# Patient Record
Sex: FEMALE | Race: WHITE | NOT HISPANIC OR LATINO | Employment: FULL TIME | URBAN - METROPOLITAN AREA
[De-identification: names, ages, dates, MRNs, and addresses within clinical notes are randomized per-mention and may not be internally consistent; named-entity substitution may affect disease eponyms.]

---

## 2019-05-13 ENCOUNTER — TRANSCRIBE ORDERS (OUTPATIENT)
Dept: WOMENS IMAGING | Facility: HOSPITAL | Age: 24
End: 2019-05-13

## 2019-05-13 DIAGNOSIS — O09.291 PRIOR PREGNANCY WITH FETAL DEMISE AND CURRENT PREGNANCY, FIRST TRIMESTER: ICD-10-CM

## 2019-05-13 DIAGNOSIS — D68.51 FACTOR V LEIDEN MUTATION (HCC): ICD-10-CM

## 2019-05-13 DIAGNOSIS — O09.291 HX OF PREECLAMPSIA, PRIOR PREGNANCY, CURRENTLY PREGNANT, FIRST TRIMESTER: Primary | ICD-10-CM

## 2019-05-31 LAB
EXTERNAL ABO GROUPING: NORMAL
EXTERNAL ANTIBODY SCREEN: NEGATIVE
EXTERNAL GROUP B STREP ANTIGEN: NEGATIVE
EXTERNAL HEPATITIS B SURFACE ANTIGEN: NEGATIVE
EXTERNAL RH FACTOR: POSITIVE
EXTERNAL RUBELLA QUALITATIVE: NORMAL
EXTERNAL SYPHILIS RPR SCREEN: NORMAL
HIV1 P24 AG SERPL QL IA: NORMAL

## 2019-06-11 ENCOUNTER — HOSPITAL ENCOUNTER (OUTPATIENT)
Dept: WOMENS IMAGING | Facility: HOSPITAL | Age: 24
Discharge: HOME | End: 2019-06-11
Admitting: OBSTETRICS & GYNECOLOGY

## 2019-06-11 ENCOUNTER — OFFICE VISIT (OUTPATIENT)
Dept: OBSTETRICS AND GYNECOLOGY | Facility: HOSPITAL | Age: 24
End: 2019-06-11

## 2019-06-11 VITALS
WEIGHT: 176.4 LBS | HEIGHT: 60 IN | DIASTOLIC BLOOD PRESSURE: 77 MMHG | BODY MASS INDEX: 34.63 KG/M2 | SYSTOLIC BLOOD PRESSURE: 138 MMHG

## 2019-06-11 DIAGNOSIS — O09.291 PRIOR PREGNANCY WITH FETAL DEMISE AND CURRENT PREGNANCY, FIRST TRIMESTER: ICD-10-CM

## 2019-06-11 DIAGNOSIS — D68.52 PROTHROMBIN GENE MUTATION (HCC): ICD-10-CM

## 2019-06-11 DIAGNOSIS — O09.299 PREVIOUS STILLBIRTH OR DEMISE, ANTEPARTUM: Primary | ICD-10-CM

## 2019-06-11 DIAGNOSIS — O09.291 HX OF PREECLAMPSIA, PRIOR PREGNANCY, CURRENTLY PREGNANT, FIRST TRIMESTER: ICD-10-CM

## 2019-06-11 DIAGNOSIS — D68.51 FACTOR V LEIDEN MUTATION (HCC): ICD-10-CM

## 2019-06-11 PROCEDURE — 76801 OB US < 14 WKS SINGLE FETUS: CPT | Performed by: OBSTETRICS & GYNECOLOGY

## 2019-06-11 PROCEDURE — 76801 OB US < 14 WKS SINGLE FETUS: CPT

## 2019-06-11 NOTE — PROGRESS NOTES
Denies problems with pregnancy. Hx preeclampsia & 24 wk fetal demise. Has Factor 2 Leiden. Taking aspirin 81 mg PO daily. Denies having genetic testing.Next OB appt 6/28/19.

## 2019-06-11 NOTE — PROGRESS NOTES
Documentation of the ultasound findings, images, and interpretations with be available in the patient's Viewpoint report located in the Chart Review Imaging tab in [employer].

## 2019-07-30 ENCOUNTER — OFFICE VISIT (OUTPATIENT)
Dept: OBSTETRICS AND GYNECOLOGY | Facility: HOSPITAL | Age: 24
End: 2019-07-30

## 2019-07-30 ENCOUNTER — HOSPITAL ENCOUNTER (OUTPATIENT)
Dept: WOMENS IMAGING | Facility: HOSPITAL | Age: 24
Discharge: HOME | End: 2019-07-30
Admitting: OBSTETRICS & GYNECOLOGY

## 2019-07-30 VITALS — SYSTOLIC BLOOD PRESSURE: 129 MMHG | WEIGHT: 179.6 LBS | BODY MASS INDEX: 35.08 KG/M2 | DIASTOLIC BLOOD PRESSURE: 90 MMHG

## 2019-07-30 DIAGNOSIS — Z34.90 PREGNANCY, UNSPECIFIED GESTATIONAL AGE: ICD-10-CM

## 2019-07-30 DIAGNOSIS — O09.299 PREVIOUS STILLBIRTH OR DEMISE, ANTEPARTUM: ICD-10-CM

## 2019-07-30 DIAGNOSIS — D68.52 PROTHROMBIN GENE MUTATION (HCC): Primary | ICD-10-CM

## 2019-07-30 DIAGNOSIS — D68.52 PROTHROMBIN GENE MUTATION (HCC): ICD-10-CM

## 2019-07-30 PROCEDURE — 76811 OB US DETAILED SNGL FETUS: CPT | Performed by: OBSTETRICS & GYNECOLOGY

## 2019-07-30 PROCEDURE — 76811 OB US DETAILED SNGL FETUS: CPT

## 2019-07-30 RX ORDER — LANOLIN ALCOHOL/MO/W.PET/CERES
400 CREAM (GRAM) TOPICAL DAILY
COMMUNITY

## 2019-07-30 NOTE — PROGRESS NOTES
"Reports BP was 102 73 @ home. States \"I have white coat syndrome; I'm extremely nervous today.\" Reports she has a respiratory illness. Denies other problems. Next OB visit is 8/15/2019.  "

## 2019-08-27 ENCOUNTER — APPOINTMENT (OUTPATIENT)
Dept: WOMENS IMAGING | Facility: HOSPITAL | Age: 24
End: 2019-08-27

## 2019-08-29 ENCOUNTER — LAB REQUISITION (OUTPATIENT)
Dept: LAB | Facility: HOSPITAL | Age: 24
End: 2019-08-29

## 2019-08-29 ENCOUNTER — HOSPITAL ENCOUNTER (OUTPATIENT)
Dept: WOMENS IMAGING | Facility: HOSPITAL | Age: 24
Discharge: HOME | End: 2019-08-29
Admitting: OBSTETRICS & GYNECOLOGY

## 2019-08-29 ENCOUNTER — OFFICE VISIT (OUTPATIENT)
Dept: OBSTETRICS AND GYNECOLOGY | Facility: HOSPITAL | Age: 24
End: 2019-08-29

## 2019-08-29 VITALS — WEIGHT: 184 LBS | SYSTOLIC BLOOD PRESSURE: 145 MMHG | BODY MASS INDEX: 35.94 KG/M2 | DIASTOLIC BLOOD PRESSURE: 77 MMHG

## 2019-08-29 DIAGNOSIS — Q56.4 AMBIGUOUS GENITALIA: ICD-10-CM

## 2019-08-29 DIAGNOSIS — Z34.90 PREGNANCY, UNSPECIFIED GESTATIONAL AGE: ICD-10-CM

## 2019-08-29 DIAGNOSIS — O09.299 PREVIOUS STILLBIRTH OR DEMISE, ANTEPARTUM: Primary | ICD-10-CM

## 2019-08-29 DIAGNOSIS — O09.299 PREVIOUS STILLBIRTH OR DEMISE, ANTEPARTUM: ICD-10-CM

## 2019-08-29 DIAGNOSIS — D68.52 PROTHROMBIN GENE MUTATION (HCC): ICD-10-CM

## 2019-08-29 DIAGNOSIS — Z00.00 ENCOUNTER FOR GENERAL ADULT MEDICAL EXAMINATION WITHOUT ABNORMAL FINDINGS: ICD-10-CM

## 2019-08-29 PROCEDURE — 76816 OB US FOLLOW-UP PER FETUS: CPT

## 2019-08-29 PROCEDURE — 76816 OB US FOLLOW-UP PER FETUS: CPT | Performed by: OBSTETRICS & GYNECOLOGY

## 2019-08-29 PROCEDURE — 36415 COLL VENOUS BLD VENIPUNCTURE: CPT

## 2019-08-29 NOTE — PROGRESS NOTES
Documentation of the ultasound findings, images, and interpretations with be available in the patient's Viewpoint report located in the Chart Review Imaging tab in [employer].

## 2019-08-29 NOTE — PROGRESS NOTES
"Denies any complaints.  Next OB appointment 9/24/19.  \"I'm always nervous at the doctor after what happened my last pregnancy. It's getting close to when things started happening last time.\"  "

## 2019-08-30 ENCOUNTER — APPOINTMENT (OUTPATIENT)
Dept: WOMENS IMAGING | Facility: HOSPITAL | Age: 24
End: 2019-08-30

## 2019-09-05 ENCOUNTER — TELEPHONE (OUTPATIENT)
Dept: WOMENS IMAGING | Facility: HOSPITAL | Age: 24
End: 2019-09-05

## 2019-09-05 NOTE — TELEPHONE ENCOUNTER
Returned call from pt requesting genetic screening lab results. Informed pt that we have not received results yet but will call pt as soon as we receive results and her dr reviews them. Pt v/u.

## 2019-09-05 NOTE — TELEPHONE ENCOUNTER
----- Message from Kriss Davis sent at 9/5/2019  8:22 AM EDT -----  Regarding: Phone Call  Contact: [phone]  Patient called requesting results from lab work last week.  Please return call.

## 2019-09-09 ENCOUNTER — TELEPHONE (OUTPATIENT)
Dept: OBSTETRICS AND GYNECOLOGY | Facility: HOSPITAL | Age: 24
End: 2019-09-09

## 2019-09-09 NOTE — TELEPHONE ENCOUNTER
Patient contacted by phone concerning results of cell free fetal DNA which returned low risk for trisomies 13,18 and 21.  Sex chromosomes consistent with male.  I remain concerned as to the possibility of ambiguous genitalia in a genetic male based on cell free fetal DNA evaluation.  I noted causes of ambiguous genitalia and a genetic male could include androgen insensitivity syndrome, 5 alpha reductase deficiency as well as abnormalites with the testes or testosterone.  I also noted that at the early gestational age, detailed evaluation of the external genitalia is not always possible.  I suggested that a repeat ultrasound be performed in 4 weeks with further attention to the fetal external genitalia.  If ultrasound findings remain concerning for ambiguous genitalia, the  cord blood should be submitted for evaluation of 5 alpha reductase deficiency, testosterone and dihydrotestosterone.  Further, pre-delivery or post delivery endocrine evaluation could be offered.  Patient verbalized her understanding of the counseling.

## 2019-09-26 ENCOUNTER — OFFICE VISIT (OUTPATIENT)
Dept: OBSTETRICS AND GYNECOLOGY | Facility: HOSPITAL | Age: 24
End: 2019-09-26

## 2019-09-26 ENCOUNTER — HOSPITAL ENCOUNTER (OUTPATIENT)
Dept: WOMENS IMAGING | Facility: HOSPITAL | Age: 24
Discharge: HOME | End: 2019-09-26
Admitting: OBSTETRICS & GYNECOLOGY

## 2019-09-26 VITALS — SYSTOLIC BLOOD PRESSURE: 126 MMHG | WEIGHT: 183.8 LBS | DIASTOLIC BLOOD PRESSURE: 70 MMHG | BODY MASS INDEX: 35.9 KG/M2

## 2019-09-26 DIAGNOSIS — D68.52 PROTHROMBIN GENE MUTATION (HCC): ICD-10-CM

## 2019-09-26 DIAGNOSIS — O09.299 PREVIOUS STILLBIRTH OR DEMISE, ANTEPARTUM: ICD-10-CM

## 2019-09-26 DIAGNOSIS — O09.299 PREVIOUS STILLBIRTH OR DEMISE, ANTEPARTUM: Primary | ICD-10-CM

## 2019-09-26 PROBLEM — Q56.4 AMBIGUOUS GENITALIA: Status: RESOLVED | Noted: 2019-08-29 | Resolved: 2019-09-26

## 2019-09-26 PROCEDURE — 76816 OB US FOLLOW-UP PER FETUS: CPT

## 2019-09-26 PROCEDURE — 76816 OB US FOLLOW-UP PER FETUS: CPT | Performed by: OBSTETRICS & GYNECOLOGY

## 2019-09-26 NOTE — PROGRESS NOTES
Documentation of the ultasound findings, images, and interpretations with be available in the patient's Viewpoint report located in the Chart Review Imaging tab in [employer].

## 2019-11-12 ENCOUNTER — HOSPITAL ENCOUNTER (OUTPATIENT)
Dept: ULTRASOUND IMAGING | Facility: HOSPITAL | Age: 24
Discharge: HOME | End: 2019-11-12
Admitting: OBSTETRICS & GYNECOLOGY

## 2019-11-12 DIAGNOSIS — O09.299 PREVIOUS STILLBIRTH OR DEMISE, ANTEPARTUM: ICD-10-CM

## 2019-11-12 DIAGNOSIS — D68.52 PROTHROMBIN GENE MUTATION (HCC): ICD-10-CM

## 2019-11-12 PROCEDURE — 76816 OB US FOLLOW-UP PER FETUS: CPT

## 2019-11-12 PROCEDURE — 76820 UMBILICAL ARTERY ECHO: CPT

## 2019-11-12 PROCEDURE — 76820 UMBILICAL ARTERY ECHO: CPT | Performed by: OBSTETRICS & GYNECOLOGY

## 2019-11-12 PROCEDURE — 76816 OB US FOLLOW-UP PER FETUS: CPT | Performed by: OBSTETRICS & GYNECOLOGY

## 2019-11-15 ENCOUNTER — HOSPITAL ENCOUNTER (OUTPATIENT)
Facility: HOSPITAL | Age: 24
Discharge: HOME | End: 2019-11-15
Attending: OBSTETRICS & GYNECOLOGY | Admitting: OBSTETRICS & GYNECOLOGY

## 2019-11-15 VITALS — SYSTOLIC BLOOD PRESSURE: 129 MMHG | DIASTOLIC BLOOD PRESSURE: 78 MMHG | RESPIRATION RATE: 18 BRPM | HEART RATE: 93 BPM

## 2019-11-15 PROBLEM — O16.9 HYPERTENSION AFFECTING PREGNANCY: Status: ACTIVE | Noted: 2019-11-15

## 2019-11-15 LAB
ALBUMIN SERPL-MCNC: 3.4 G/DL (ref 3.5–5.2)
ALBUMIN SERPL-MCNC: 3.4 G/DL (ref 3.5–5.2)
ALBUMIN/GLOB SERPL: 1.1 G/DL
ALP SERPL-CCNC: 99 U/L (ref 39–117)
ALP SERPL-CCNC: 99 U/L (ref 39–117)
ALT SERPL W P-5'-P-CCNC: 15 U/L (ref 1–33)
ALT SERPL W P-5'-P-CCNC: 15 U/L (ref 1–33)
ANION GAP SERPL CALCULATED.3IONS-SCNC: 12.9 MMOL/L (ref 5–15)
AST SERPL-CCNC: 17 U/L (ref 1–32)
AST SERPL-CCNC: 17 U/L (ref 1–32)
BASOPHILS # BLD AUTO: 0.03 10*3/MM3 (ref 0–0.2)
BASOPHILS NFR BLD AUTO: 0.3 % (ref 0–1.5)
BILIRUB CONJ SERPL-MCNC: <0.2 MG/DL (ref 0.2–0.3)
BILIRUB INDIRECT SERPL-MCNC: ABNORMAL MG/DL
BILIRUB SERPL-MCNC: 0.4 MG/DL (ref 0.2–1.2)
BILIRUB SERPL-MCNC: 0.4 MG/DL (ref 0.2–1.2)
BUN BLD-MCNC: 6 MG/DL (ref 6–20)
BUN/CREAT SERPL: 11.1 (ref 7–25)
CALCIUM SPEC-SCNC: 9.1 MG/DL (ref 8.6–10.5)
CHLORIDE SERPL-SCNC: 107 MMOL/L (ref 98–107)
CO2 SERPL-SCNC: 17.1 MMOL/L (ref 22–29)
CREAT BLD-MCNC: 0.54 MG/DL (ref 0.57–1)
DEPRECATED RDW RBC AUTO: 43.2 FL (ref 37–54)
EOSINOPHIL # BLD AUTO: 0.05 10*3/MM3 (ref 0–0.4)
EOSINOPHIL NFR BLD AUTO: 0.5 % (ref 0.3–6.2)
ERYTHROCYTE [DISTWIDTH] IN BLOOD BY AUTOMATED COUNT: 13.3 % (ref 12.3–15.4)
GFR SERPL CREATININE-BSD FRML MDRD: 136 ML/MIN/1.73
GLOBULIN UR ELPH-MCNC: 3.2 GM/DL
GLUCOSE BLD-MCNC: 104 MG/DL (ref 65–99)
HCT VFR BLD AUTO: 37.3 % (ref 34–46.6)
HGB BLD-MCNC: 12.3 G/DL (ref 12–15.9)
IMM GRANULOCYTES # BLD AUTO: 0.04 10*3/MM3 (ref 0–0.05)
IMM GRANULOCYTES NFR BLD AUTO: 0.4 % (ref 0–0.5)
LYMPHOCYTES # BLD AUTO: 1.05 10*3/MM3 (ref 0.7–3.1)
LYMPHOCYTES NFR BLD AUTO: 10.2 % (ref 19.6–45.3)
MCH RBC QN AUTO: 29.2 PG (ref 26.6–33)
MCHC RBC AUTO-ENTMCNC: 33 G/DL (ref 31.5–35.7)
MCV RBC AUTO: 88.6 FL (ref 79–97)
MONOCYTES # BLD AUTO: 0.32 10*3/MM3 (ref 0.1–0.9)
MONOCYTES NFR BLD AUTO: 3.1 % (ref 5–12)
NEUTROPHILS # BLD AUTO: 8.84 10*3/MM3 (ref 1.7–7)
NEUTROPHILS NFR BLD AUTO: 85.5 % (ref 42.7–76)
NRBC BLD AUTO-RTO: 0 /100 WBC (ref 0–0.2)
PLATELET # BLD AUTO: 227 10*3/MM3 (ref 140–450)
PMV BLD AUTO: 10.6 FL (ref 6–12)
POTASSIUM BLD-SCNC: 3.9 MMOL/L (ref 3.5–5.2)
PROT SERPL-MCNC: 6.6 G/DL (ref 6–8.5)
PROT SERPL-MCNC: 6.6 G/DL (ref 6–8.5)
RBC # BLD AUTO: 4.21 10*6/MM3 (ref 3.77–5.28)
SODIUM BLD-SCNC: 137 MMOL/L (ref 136–145)
URATE SERPL-MCNC: 4 MG/DL (ref 2.4–5.7)
WBC NRBC COR # BLD: 10.33 10*3/MM3 (ref 3.4–10.8)

## 2019-11-15 PROCEDURE — 59025 FETAL NON-STRESS TEST: CPT

## 2019-11-15 PROCEDURE — 84550 ASSAY OF BLOOD/URIC ACID: CPT | Performed by: OBSTETRICS & GYNECOLOGY

## 2019-11-15 PROCEDURE — 80053 COMPREHEN METABOLIC PANEL: CPT | Performed by: OBSTETRICS & GYNECOLOGY

## 2019-11-15 PROCEDURE — G0463 HOSPITAL OUTPT CLINIC VISIT: HCPCS

## 2019-11-15 PROCEDURE — 80076 HEPATIC FUNCTION PANEL: CPT | Performed by: OBSTETRICS & GYNECOLOGY

## 2019-11-15 PROCEDURE — 36415 COLL VENOUS BLD VENIPUNCTURE: CPT | Performed by: OBSTETRICS & GYNECOLOGY

## 2019-11-15 PROCEDURE — 85025 COMPLETE CBC W/AUTO DIFF WBC: CPT | Performed by: OBSTETRICS & GYNECOLOGY

## 2019-11-15 NOTE — NON STRESS TEST
Nabila Voss, a  at 33w1d with an JOVANNA of 2020, by Ultrasound, was seen at [facility] LABOR DELIVERY for a nonstress test.    Chief Complaint   Patient presents with   • Hypertension-Pregnant     Elevated SD ratio in office today, elevated bp reading in office as well. Dr cheek sent pt over for labs and NST. Denies bleeding, LOF, + FM       Patient Active Problem List   Diagnosis   • Previous stillbirth or demise, antepartum   • Prothrombin gene mutation (CMS/[facility])   • Hypertension affecting pregnancy       Start Time: 1155  Stop Time: 1215    Interpretation A  Nonstress Test Interpretation A: Reactive (11/15/19 1215 : Melanie Emmanuel, RN)  Comments A: verified by carissa thao rn (11/15/19 1215 : Melanie Emmanuel, RN)

## 2019-11-15 NOTE — NURSING NOTE
Spoke with MD about patient labs, reactive NST. Sending pt home with 24 hour urine collection samples to start Sunday, to bring to office Monday per Dr Donald.

## 2019-11-15 NOTE — H&P
"      Chief complaint CHTN. Gestational diabetic, Factor 5 Leiden, Elevated SD. IUFD at 24 weeks.    History of Present Illness: Patient is a 24 y.o. female who presents with IUP at 33w1d weeks gestation. G 2, P 0100.. CHTN, Gestational Diabetic, IUFD at 24 weeks, factor 5 Leiden, elevated SD.        Past Medical History:   Diagnosis Date   • Disease of thyroid gland     10-15 yrs ago. No problems in years.    • Hx of preeclampsia, prior pregnancy, currently pregnant    • Prothrombin gene mutation (CMS/HCC)     \"heterozygous\"; dx @ [facility] after 24 week IUFD   CHTN  Gestational Diabetic   IUFD at 24 weeks  Factor 5 Leiden     Blood Type: A+  Fetal Gender: Male     Past Surgical History:   Procedure Laterality Date   • BREAST AUGMENTATION  2011     No family history on file.  Social History     Tobacco Use   • Smoking status: Never Smoker   • Smokeless tobacco: Never Used   Substance Use Topics   • Alcohol use: No     Frequency: Never   • Drug use: No     Medications Prior to Admission   Medication Sig Dispense Refill Last Dose   • aspirin 81 MG tablet Take 81 mg by mouth Daily.   Taking   • folic acid (FOLVITE) 400 MCG tablet Take 400 mcg by mouth Daily.   Taking   • Prenatal Vit-Fe Fumarate-FA (PRENATAL VITAMIN PO) Take 1 tablet by mouth Daily.   Taking     Allergies:  Stadol [butorphanol]      Vital Signs   See Chart     Radiology  Imaging Results (Last 24 Hours)     ** No results found for the last 24 hours. **          Labs  Lab Results (last 24 hours)     ** No results found for the last 24 hours. **            Review of Systems    The following systems were reviewed and negative;  ENT, respiratory, cardiovascular, gastrointestinal, genitourinary, breast, endocrine and allergies / immunologic.      Physical Exam:      General Appearance:    Alert, cooperative, in no acute distress   Head:    Normocephalic, without obvious abnormality, atraumatic   Eyes:            Lids and lashes normal, conjunctivae and sclerae " normal, no   icterus, no pallor, corneas clear, PERRLA   Ears:    Ears appear intact with no abnormalities noted   Throat:   No oral lesions, no thrush, oral mucosa moist   Neck:   No adenopathy, supple, trachea midline, no thyromegaly, no     carotid bruit, no JVD   Back:     No kyphosis present, no scoliosis present, no skin lesions,       erythema or scars, no tenderness to percussion or                   palpation,   range of motion normal   Lungs:     Clear to auscultation,respirations regular, even and                   unlabored    Heart:    Regular rhythm and normal rate, normal S1 and S2, no            murmur, no gallop, no rub, no click   Breast Exam:    Deferred   Abdomen:     Normal bowel sounds, no masses, no organomegaly, soft        non-tender, non-distended, no guarding, no rebound                 tenderness   Genitalia:    Deferred   Extremities:   Moves all extremities well, no edema, no cyanosis, no              redness   Pulses:   Pulses palpable and equal bilaterally   Skin:   No bleeding, bruising or rash   Lymph nodes:   No palpable adenopathy   Neurologic:   Cranial nerves 2 - 12 grossly intact, sensation intact, DTR        present and equal bilaterally         Assessment: Patient is a 24 y.o. female who presents with IUP at 33w1d weeks gestation. G 2, P 0100.. CHTN, Gestational Diabetic, IUFD at 24 weeks, factor 5 Leiden, elevated SD.     Plan of Care: Admit. Proceed with pre eclamptic labs, extended observation.      Boone Casper III, MD  11/15/19  11:11 AM

## 2019-11-19 DIAGNOSIS — O09.299 PREVIOUS STILLBIRTH OR DEMISE, ANTEPARTUM: Primary | ICD-10-CM

## 2019-11-19 DIAGNOSIS — D68.52 PROTHROMBIN GENE MUTATION (HCC): ICD-10-CM

## 2019-12-04 ENCOUNTER — APPOINTMENT (OUTPATIENT)
Dept: WOMENS IMAGING | Facility: HOSPITAL | Age: 24
End: 2019-12-04

## 2019-12-11 ENCOUNTER — HOSPITAL ENCOUNTER (INPATIENT)
Facility: HOSPITAL | Age: 24
LOS: 1 days | Discharge: HOME | End: 2019-12-12
Attending: OBSTETRICS & GYNECOLOGY | Admitting: OBSTETRICS & GYNECOLOGY

## 2019-12-11 ENCOUNTER — HOSPITAL ENCOUNTER (OUTPATIENT)
Dept: LABOR AND DELIVERY | Facility: HOSPITAL | Age: 24
Discharge: HOME | End: 2019-12-11

## 2019-12-11 PROBLEM — Z34.90 PREGNANT: Status: ACTIVE | Noted: 2019-12-11

## 2019-12-11 LAB
ABO GROUP BLD: NORMAL
ALBUMIN SERPL-MCNC: 3.24 G/DL (ref 3.5–5.2)
ALBUMIN/GLOB SERPL: 1 G/DL
ALP SERPL-CCNC: 123 U/L (ref 39–117)
ALT SERPL W P-5'-P-CCNC: 11 U/L (ref 1–33)
ANION GAP SERPL CALCULATED.3IONS-SCNC: 16.1 MMOL/L (ref 5–15)
AST SERPL-CCNC: 18 U/L (ref 1–32)
BASOPHILS # BLD AUTO: 0.05 10*3/MM3 (ref 0–0.2)
BASOPHILS NFR BLD AUTO: 0.5 % (ref 0–1.5)
BILIRUB SERPL-MCNC: 0.5 MG/DL (ref 0.2–1.2)
BLD GP AB SCN SERPL QL: NEGATIVE
BUN BLD-MCNC: 8 MG/DL (ref 6–20)
BUN/CREAT SERPL: 11.9 (ref 7–25)
CALCIUM SPEC-SCNC: 9.1 MG/DL (ref 8.6–10.5)
CHLORIDE SERPL-SCNC: 106 MMOL/L (ref 98–107)
CO2 SERPL-SCNC: 17.9 MMOL/L (ref 22–29)
CREAT BLD-MCNC: 0.67 MG/DL (ref 0.57–1)
DEPRECATED RDW RBC AUTO: 42.2 FL (ref 37–54)
EOSINOPHIL # BLD AUTO: 0.06 10*3/MM3 (ref 0–0.4)
EOSINOPHIL NFR BLD AUTO: 0.6 % (ref 0.3–6.2)
ERYTHROCYTE [DISTWIDTH] IN BLOOD BY AUTOMATED COUNT: 12.9 % (ref 12.3–15.4)
GFR SERPL CREATININE-BSD FRML MDRD: 106 ML/MIN/1.73
GLOBULIN UR ELPH-MCNC: 3.4 GM/DL
GLUCOSE BLD-MCNC: 90 MG/DL (ref 65–99)
GLUCOSE BLDC GLUCOMTR-MCNC: 87 MG/DL (ref 70–130)
GLUCOSE BLDC GLUCOMTR-MCNC: 89 MG/DL (ref 70–130)
HCT VFR BLD AUTO: 38 % (ref 34–46.6)
HGB BLD-MCNC: 12.7 G/DL (ref 12–15.9)
IMM GRANULOCYTES # BLD AUTO: 0.03 10*3/MM3 (ref 0–0.05)
IMM GRANULOCYTES NFR BLD AUTO: 0.3 % (ref 0–0.5)
LYMPHOCYTES # BLD AUTO: 1.85 10*3/MM3 (ref 0.7–3.1)
LYMPHOCYTES NFR BLD AUTO: 18.5 % (ref 19.6–45.3)
MCH RBC QN AUTO: 29.6 PG (ref 26.6–33)
MCHC RBC AUTO-ENTMCNC: 33.4 G/DL (ref 31.5–35.7)
MCV RBC AUTO: 88.6 FL (ref 79–97)
MONOCYTES # BLD AUTO: 0.51 10*3/MM3 (ref 0.1–0.9)
MONOCYTES NFR BLD AUTO: 5.1 % (ref 5–12)
NEUTROPHILS # BLD AUTO: 7.48 10*3/MM3 (ref 1.7–7)
NEUTROPHILS NFR BLD AUTO: 75 % (ref 42.7–76)
NRBC BLD AUTO-RTO: 0 /100 WBC (ref 0–0.2)
PLATELET # BLD AUTO: 216 10*3/MM3 (ref 140–450)
PMV BLD AUTO: 11.6 FL (ref 6–12)
POTASSIUM BLD-SCNC: 3.5 MMOL/L (ref 3.5–5.2)
PROT SERPL-MCNC: 6.6 G/DL (ref 6–8.5)
RBC # BLD AUTO: 4.29 10*6/MM3 (ref 3.77–5.28)
RH BLD: POSITIVE
SODIUM BLD-SCNC: 140 MMOL/L (ref 136–145)
T&S EXPIRATION DATE: NORMAL
WBC NRBC COR # BLD: 9.98 10*3/MM3 (ref 3.4–10.8)

## 2019-12-11 PROCEDURE — 86900 BLOOD TYPING SEROLOGIC ABO: CPT | Performed by: OBSTETRICS & GYNECOLOGY

## 2019-12-11 PROCEDURE — 85025 COMPLETE CBC W/AUTO DIFF WBC: CPT | Performed by: OBSTETRICS & GYNECOLOGY

## 2019-12-11 PROCEDURE — 86900 BLOOD TYPING SEROLOGIC ABO: CPT

## 2019-12-11 PROCEDURE — 86901 BLOOD TYPING SEROLOGIC RH(D): CPT | Performed by: OBSTETRICS & GYNECOLOGY

## 2019-12-11 PROCEDURE — 80053 COMPREHEN METABOLIC PANEL: CPT | Performed by: OBSTETRICS & GYNECOLOGY

## 2019-12-11 PROCEDURE — 82962 GLUCOSE BLOOD TEST: CPT

## 2019-12-11 PROCEDURE — 86901 BLOOD TYPING SEROLOGIC RH(D): CPT

## 2019-12-11 PROCEDURE — 86850 RBC ANTIBODY SCREEN: CPT | Performed by: OBSTETRICS & GYNECOLOGY

## 2019-12-11 RX ORDER — SODIUM CHLORIDE, SODIUM LACTATE, POTASSIUM CHLORIDE, CALCIUM CHLORIDE 600; 310; 30; 20 MG/100ML; MG/100ML; MG/100ML; MG/100ML
125 INJECTION, SOLUTION INTRAVENOUS CONTINUOUS
Status: DISCONTINUED | OUTPATIENT
Start: 2019-12-11 | End: 2019-12-12

## 2019-12-11 RX ORDER — SODIUM CHLORIDE 0.9 % (FLUSH) 0.9 %
3-10 SYRINGE (ML) INJECTION AS NEEDED
Status: DISCONTINUED | OUTPATIENT
Start: 2019-12-11 | End: 2019-12-12

## 2019-12-11 RX ORDER — PRENATAL VIT/IRON FUM/FOLIC AC 27MG-0.8MG
1 TABLET ORAL DAILY
Status: DISCONTINUED | OUTPATIENT
Start: 2019-12-12 | End: 2019-12-12 | Stop reason: HOSPADM

## 2019-12-11 RX ORDER — ONDANSETRON 2 MG/ML
4 INJECTION INTRAMUSCULAR; INTRAVENOUS EVERY 6 HOURS PRN
Status: DISCONTINUED | OUTPATIENT
Start: 2019-12-11 | End: 2019-12-12

## 2019-12-11 RX ORDER — ASPIRIN 81 MG/1
81 TABLET, CHEWABLE ORAL DAILY
Status: DISCONTINUED | OUTPATIENT
Start: 2019-12-12 | End: 2019-12-12 | Stop reason: HOSPADM

## 2019-12-11 RX ORDER — GLIPIZIDE 5 MG/1
5 TABLET ORAL
Status: DISCONTINUED | OUTPATIENT
Start: 2019-12-12 | End: 2019-12-12 | Stop reason: HOSPADM

## 2019-12-11 RX ORDER — PRENATAL VIT/IRON FUM/FOLIC AC 27MG-0.8MG
1 TABLET ORAL DAILY
COMMUNITY

## 2019-12-11 RX ORDER — OXYTOCIN-SODIUM CHLORIDE 0.9% IV SOLN 30 UNIT/500ML 30-0.9/5 UT/ML-%
2 SOLUTION INTRAVENOUS
Status: DISCONTINUED | OUTPATIENT
Start: 2019-12-11 | End: 2019-12-11

## 2019-12-11 RX ORDER — GLYBURIDE 2.5 MG/1
2.5 TABLET ORAL
COMMUNITY
End: 2019-12-12 | Stop reason: HOSPADM

## 2019-12-11 RX ORDER — ONDANSETRON 4 MG/1
4 TABLET, FILM COATED ORAL EVERY 6 HOURS PRN
Status: DISCONTINUED | OUTPATIENT
Start: 2019-12-11 | End: 2019-12-12

## 2019-12-11 RX ORDER — LANOLIN ALCOHOL/MO/W.PET/CERES
400 CREAM (GRAM) TOPICAL DAILY
Status: DISCONTINUED | OUTPATIENT
Start: 2019-12-12 | End: 2019-12-12 | Stop reason: HOSPADM

## 2019-12-11 RX ORDER — BUTORPHANOL TARTRATE 1 MG/ML
1 INJECTION, SOLUTION INTRAMUSCULAR; INTRAVENOUS
Status: DISCONTINUED | OUTPATIENT
Start: 2019-12-11 | End: 2019-12-12

## 2019-12-11 RX ORDER — ACETAMINOPHEN 325 MG/1
650 TABLET ORAL EVERY 4 HOURS PRN
Status: DISCONTINUED | OUTPATIENT
Start: 2019-12-11 | End: 2019-12-12

## 2019-12-11 RX ORDER — MISOPROSTOL 100 UG/1
25 TABLET ORAL EVERY 4 HOURS PRN
Status: DISCONTINUED | OUTPATIENT
Start: 2019-12-11 | End: 2019-12-12

## 2019-12-11 RX ADMIN — MISOPROSTOL 25 MCG: 100 TABLET ORAL at 22:30

## 2019-12-11 RX ADMIN — MISOPROSTOL 25 MCG: 100 TABLET ORAL at 18:23

## 2019-12-11 NOTE — H&P
BRIAN Reed  Obstetric History and Physical    Chief Complaint   Patient presents with   • Scheduled Induction       Subjective     Patient is a 24 y.o. female  currently at 36w6d, who presents for IOL secondary to GDM, CHTN, H/O IUFD 24 weeks secondary to pre-eclampsia, PGM and elevated Umbilical doppler ratio.    Her prenatal care is complicated by  hypertension  chronic hypertension and diabetes  GDM A2.  Her previous obstetric/gynecological history is noted for is remarkable for previous IUFD as above.      The following portions of the patients history were reviewed and updated as appropriate: current medications, allergies, past medical history, past surgical history, past family history, past social history and problem list .       Prenatal Information:   Maternal Prenatal Labs  Blood Type No results found for: ABO   Rh Status No results found for: RH   Antibody Screen No results found for: ABSCRN   Rapid Urine Drug Screen No results found for: AMPMETHU, BARBITSCNUR, LABBENZSCN, LABMETHSCN, LABOPIASCN, THCURSCR, COCAINEUR, AMPHETSCREEN, PROPOXSCN, BUPRENORSCNU, METAMPSCNUR, OXYCODONESCN, TRICYCLICSCN   Group B Strep Culture No results found for: GBSANTIGEN           External Prenatal Results     Pregnancy Outside Results - Transcribed From Office Records - See Scanned Records For Details     Test Value Date Time    Hgb 12.3 g/dL 11/15/19 1133    Hct 37.3 % 11/15/19 1133    ABO       Rh       Antibody Screen       Glucose Fasting GTT       Glucose Tolerance Test 1 hour       Glucose Tolerance Test 3 hour       Gonorrhea (discrete)       Chlamydia (discrete)       RPR       VDRL       Syphilis Antibody       Rubella       HBsAg       Herpes Simplex Virus PCR       Herpes Simplex VIrus Culture       HIV       Hep C RNA Quant PCR       Hep C Antibody       AFP       Group B Strep       GBS Susceptibility to Clindamycin       GBS Susceptibility to Erythromycin       Fetal Fibronectin       Genetic Testing,  "Maternal Blood             Drug Screening     Test Value Date Time    Urine Drug Screen       Amphetamine Screen       Barbiturate Screen       Benzodiazepine Screen       Methadone Screen       Phencyclidine Screen       Opiates Screen       THC Screen       Cocaine Screen       Propoxyphene Screen       Buprenorphine Screen       Methamphetamine Screen       Oxycodone Screen       Tricyclic Antidepressants Screen                     Past OB History:     OB History    Para Term  AB Living   2 1 0 1 0 0   SAB TAB Ectopic Molar Multiple Live Births   0 0 0 0 0 0      # Outcome Date GA Lbr Carlos A/2nd Weight Sex Delivery Anes PTL Lv   2 Current            1  10/20/16 24w0d  340 g (12 oz) F Vag-Spont   FD      Complications: Preeclampsia, Oligohydramnios, IUGR (intrauterine growth restriction) affecting care of mother      Obstetric Comments   FOB #1 : Pregnancy #1-2       Past Medical History: Past Medical History:   Diagnosis Date   • Disease of thyroid gland     10-15 yrs ago. No problems in years.    • Hx of preeclampsia, prior pregnancy, currently pregnant    • Prothrombin gene mutation (CMS/HCC)     \"heterozygous\"; dx @ [facility] after 24 week IUFD      Past Surgical History Past Surgical History:   Procedure Laterality Date   • BREAST AUGMENTATION        Family History: No family history on file.   Social History:  reports that she has never smoked. She has never used smokeless tobacco.   reports that she does not drink alcohol.   reports that she does not use drugs.        Review of Systems      Objective     Vital Signs Range for the last 24 hours  Temperature: Temp:  [98 °F (36.7 °C)] 98 °F (36.7 °C)   Temp Source: Temp src: Oral   BP:     Pulse:     Respirations: Resp:  [18] 18   Weight: Weight:  [87 kg (191 lb 12.8 oz)] 87 kg (191 lb 12.8 oz)     Physical Examination: General appearance - oriented to person, place, and time  Chest - clear to auscultation, no wheezes, rales or rhonchi, " symmetric air entry  Heart - normal rate and regular rhythm  Abdomen - soft, nontender, nondistended, no masses  Extremities - no pedal edema noted    Presentation: cephalic   Cervix: Exam by:  Dr Donald   Dilation:  Fingertip   Effacement:  50   Station:  -4         Assessment/Plan       Pregnant      Assessment & Plan    Assessment/Plan:  1.  Intrauterine pregnancy at 36w6d weeks gestation with reactive fetal status.    2.  A2DM- glyburide 2.5 mg at home but none today.  BS q1 hour and if normal q4 hour through the night.    3. CHTN- stable no medication  4. Heterozygous PGM- stopped ASA yesterday.   5. H/O IUFD with severe pre-eclampsia 24 weeks previous pregnancy.  For delivery today.   6. Elevated UA SD ratio- for delivery.       Heydi Donald DO  12/11/2019  6:28 PM

## 2019-12-12 ENCOUNTER — ANESTHESIA EVENT (OUTPATIENT)
Dept: LABOR AND DELIVERY | Facility: HOSPITAL | Age: 24
End: 2019-12-12

## 2019-12-12 ENCOUNTER — ANESTHESIA (OUTPATIENT)
Dept: LABOR AND DELIVERY | Facility: HOSPITAL | Age: 24
End: 2019-12-12

## 2019-12-12 VITALS
RESPIRATION RATE: 32 BRPM | DIASTOLIC BLOOD PRESSURE: 63 MMHG | SYSTOLIC BLOOD PRESSURE: 127 MMHG | BODY MASS INDEX: 37.66 KG/M2 | WEIGHT: 191.8 LBS | OXYGEN SATURATION: 99 % | TEMPERATURE: 98.2 F | HEIGHT: 60 IN | HEART RATE: 128 BPM

## 2019-12-12 PROBLEM — Z34.90 PREGNANT: Status: RESOLVED | Noted: 2019-12-11 | Resolved: 2019-12-12

## 2019-12-12 LAB
GLUCOSE BLDC GLUCOMTR-MCNC: 100 MG/DL (ref 70–130)
GLUCOSE BLDC GLUCOMTR-MCNC: 258 MG/DL (ref 70–130)
GLUCOSE BLDC GLUCOMTR-MCNC: 89 MG/DL (ref 70–130)
GLUCOSE BLDC GLUCOMTR-MCNC: 93 MG/DL (ref 70–130)

## 2019-12-12 PROCEDURE — 82962 GLUCOSE BLOOD TEST: CPT

## 2019-12-12 PROCEDURE — 3E033VJ INTRODUCTION OF OTHER HORMONE INTO PERIPHERAL VEIN, PERCUTANEOUS APPROACH: ICD-10-PCS | Performed by: OBSTETRICS & GYNECOLOGY

## 2019-12-12 PROCEDURE — C1755 CATHETER, INTRASPINAL: HCPCS | Performed by: ANESTHESIOLOGY

## 2019-12-12 PROCEDURE — C1755 CATHETER, INTRASPINAL: HCPCS

## 2019-12-12 RX ORDER — SODIUM CHLORIDE 0.9 % (FLUSH) 0.9 %
10 SYRINGE (ML) INJECTION EVERY 12 HOURS SCHEDULED
Status: DISCONTINUED | OUTPATIENT
Start: 2019-12-12 | End: 2019-12-12

## 2019-12-12 RX ORDER — HYDROXYZINE 50 MG/1
50 TABLET, FILM COATED ORAL NIGHTLY PRN
Status: DISCONTINUED | OUTPATIENT
Start: 2019-12-12 | End: 2019-12-12 | Stop reason: HOSPADM

## 2019-12-12 RX ORDER — CARBOPROST TROMETHAMINE 250 UG/ML
250 INJECTION, SOLUTION INTRAMUSCULAR
Status: DISCONTINUED | OUTPATIENT
Start: 2019-12-12 | End: 2019-12-12 | Stop reason: HOSPADM

## 2019-12-12 RX ORDER — SODIUM CHLORIDE 0.9 % (FLUSH) 0.9 %
10 SYRINGE (ML) INJECTION AS NEEDED
Status: DISCONTINUED | OUTPATIENT
Start: 2019-12-12 | End: 2019-12-12

## 2019-12-12 RX ORDER — MINERAL OIL
OIL (ML) MISCELLANEOUS
Status: DISCONTINUED | OUTPATIENT
Start: 2019-12-12 | End: 2019-12-12

## 2019-12-12 RX ORDER — SODIUM CHLORIDE, SODIUM LACTATE, POTASSIUM CHLORIDE, CALCIUM CHLORIDE 600; 310; 30; 20 MG/100ML; MG/100ML; MG/100ML; MG/100ML
125 INJECTION, SOLUTION INTRAVENOUS CONTINUOUS
Status: DISCONTINUED | OUTPATIENT
Start: 2019-12-12 | End: 2019-12-12

## 2019-12-12 RX ORDER — BUPIVACAINE HYDROCHLORIDE 2.5 MG/ML
INJECTION, SOLUTION EPIDURAL; INFILTRATION; INTRACAUDAL
Status: COMPLETED
Start: 2019-12-12 | End: 2019-12-12

## 2019-12-12 RX ORDER — HYDROCODONE BITARTRATE AND ACETAMINOPHEN 5; 325 MG/1; MG/1
1 TABLET ORAL EVERY 4 HOURS PRN
Status: DISCONTINUED | OUTPATIENT
Start: 2019-12-12 | End: 2019-12-12 | Stop reason: HOSPADM

## 2019-12-12 RX ORDER — BUPIVACAINE HYDROCHLORIDE 2.5 MG/ML
INJECTION, SOLUTION EPIDURAL; INFILTRATION; INTRACAUDAL AS NEEDED
Status: DISCONTINUED | OUTPATIENT
Start: 2019-12-12 | End: 2019-12-12 | Stop reason: SURG

## 2019-12-12 RX ORDER — ONDANSETRON 2 MG/ML
4 INJECTION INTRAMUSCULAR; INTRAVENOUS EVERY 6 HOURS PRN
Status: DISCONTINUED | OUTPATIENT
Start: 2019-12-12 | End: 2019-12-12 | Stop reason: HOSPADM

## 2019-12-12 RX ORDER — IBUPROFEN 800 MG/1
800 TABLET ORAL EVERY 6 HOURS PRN
Status: DISCONTINUED | OUTPATIENT
Start: 2019-12-12 | End: 2019-12-12

## 2019-12-12 RX ORDER — BISACODYL 10 MG
10 SUPPOSITORY, RECTAL RECTAL DAILY PRN
Status: DISCONTINUED | OUTPATIENT
Start: 2019-12-13 | End: 2019-12-12 | Stop reason: HOSPADM

## 2019-12-12 RX ORDER — METHYLERGONOVINE MALEATE 0.2 MG/ML
200 INJECTION INTRAVENOUS ONCE AS NEEDED
Status: DISCONTINUED | OUTPATIENT
Start: 2019-12-12 | End: 2019-12-12 | Stop reason: HOSPADM

## 2019-12-12 RX ORDER — LANOLIN 100 %
OINTMENT (GRAM) TOPICAL
Status: DISCONTINUED | OUTPATIENT
Start: 2019-12-12 | End: 2019-12-12 | Stop reason: HOSPADM

## 2019-12-12 RX ORDER — METHYLERGONOVINE MALEATE 0.2 MG/ML
200 INJECTION INTRAVENOUS ONCE AS NEEDED
Status: DISCONTINUED | OUTPATIENT
Start: 2019-12-12 | End: 2019-12-12

## 2019-12-12 RX ORDER — MISOPROSTOL 100 UG/1
600 TABLET ORAL ONCE AS NEEDED
Status: DISCONTINUED | OUTPATIENT
Start: 2019-12-12 | End: 2019-12-12 | Stop reason: HOSPADM

## 2019-12-12 RX ORDER — ASPIRIN 81 MG/1
81 TABLET, CHEWABLE ORAL DAILY
Qty: 30 TABLET | Refills: 1 | Status: SHIPPED | OUTPATIENT
Start: 2019-12-13

## 2019-12-12 RX ORDER — ONDANSETRON 4 MG/1
4 TABLET, FILM COATED ORAL EVERY 6 HOURS PRN
Status: DISCONTINUED | OUTPATIENT
Start: 2019-12-12 | End: 2019-12-12 | Stop reason: HOSPADM

## 2019-12-12 RX ORDER — OXYTOCIN-SODIUM CHLORIDE 0.9% IV SOLN 30 UNIT/500ML 30-0.9/5 UT/ML-%
2 SOLUTION INTRAVENOUS
Status: DISCONTINUED | OUTPATIENT
Start: 2019-12-12 | End: 2019-12-12 | Stop reason: HOSPADM

## 2019-12-12 RX ORDER — ROPIVACAINE HYDROCHLORIDE 2 MG/ML
INJECTION, SOLUTION EPIDURAL; INFILTRATION; PERINEURAL
Status: DISCONTINUED
Start: 2019-12-12 | End: 2019-12-12 | Stop reason: HOSPADM

## 2019-12-12 RX ORDER — ACETAMINOPHEN 325 MG/1
650 TABLET ORAL EVERY 4 HOURS PRN
Status: DISCONTINUED | OUTPATIENT
Start: 2019-12-12 | End: 2019-12-12

## 2019-12-12 RX ORDER — GLIPIZIDE 5 MG/1
5 TABLET ORAL
Qty: 30 TABLET | Refills: 1 | Status: SHIPPED | OUTPATIENT
Start: 2019-12-13

## 2019-12-12 RX ORDER — DOCUSATE SODIUM 100 MG/1
100 CAPSULE, LIQUID FILLED ORAL 2 TIMES DAILY PRN
Status: DISCONTINUED | OUTPATIENT
Start: 2019-12-12 | End: 2019-12-12 | Stop reason: HOSPADM

## 2019-12-12 RX ORDER — SODIUM CHLORIDE 0.9 % (FLUSH) 0.9 %
1-10 SYRINGE (ML) INJECTION AS NEEDED
Status: DISCONTINUED | OUTPATIENT
Start: 2019-12-12 | End: 2019-12-12 | Stop reason: HOSPADM

## 2019-12-12 RX ORDER — IBUPROFEN 800 MG/1
800 TABLET ORAL EVERY 8 HOURS PRN
Qty: 60 TABLET | Refills: 0 | Status: SHIPPED | OUTPATIENT
Start: 2019-12-12

## 2019-12-12 RX ORDER — MISOPROSTOL 100 UG/1
600 TABLET ORAL AS NEEDED
Status: DISCONTINUED | OUTPATIENT
Start: 2019-12-12 | End: 2019-12-12

## 2019-12-12 RX ORDER — CARBOPROST TROMETHAMINE 250 UG/ML
250 INJECTION, SOLUTION INTRAMUSCULAR AS NEEDED
Status: DISCONTINUED | OUTPATIENT
Start: 2019-12-12 | End: 2019-12-12

## 2019-12-12 RX ORDER — IBUPROFEN 800 MG/1
800 TABLET ORAL EVERY 8 HOURS SCHEDULED
Status: DISCONTINUED | OUTPATIENT
Start: 2019-12-12 | End: 2019-12-12 | Stop reason: HOSPADM

## 2019-12-12 RX ADMIN — SODIUM CHLORIDE, POTASSIUM CHLORIDE, SODIUM LACTATE AND CALCIUM CHLORIDE 1000 ML: 600; 310; 30; 20 INJECTION, SOLUTION INTRAVENOUS at 08:20

## 2019-12-12 RX ADMIN — OXYTOCIN 2 MILLI-UNITS/MIN: 10 INJECTION INTRAVENOUS at 02:59

## 2019-12-12 RX ADMIN — BUPIVACAINE HYDROCHLORIDE 10 ML: 2.5 INJECTION, SOLUTION EPIDURAL; INFILTRATION; INTRACAUDAL; PERINEURAL at 07:58

## 2019-12-12 NOTE — PAYOR COMM NOTE
"CONTACT:  PRAMOD BRONSON MSN, APRN  UTILIZATION MANAGEMENT DEPT.  [facility]  [address]  [facility], [address]  PHONE:  [phone]  FAX: [phone]    REQUEST FOR INPATIENT DELIVERY AUTHORIZATION.    ICD 10 CODE: O80  DR. UMU WEBER NPI: [id number]  [facility] NPI: [id number]    Nabila Caldwell (24 y.o. Female)     Date of Birth Social Security Number Address Home Phone MRN    [id number]  [address] [place] [id number] [phone] [id number]    Mandaeism Marital Status          Pentecostal        Admission Date Admission Type Admitting Provider Attending Provider Department, Room/Bed    19 Urgent Heydi Donald, Heydi Qiu DO [facility] LABOR DELIVERY, L229/1    Discharge Date Discharge Disposition Discharge Destination                       Attending Provider:  Heydi Donald DO    Allergies:  Stadol [Butorphanol]    Isolation:  None   Infection:  None   Code Status:  CPR    Ht:  152.4 cm (60\")   Wt:  87 kg (191 lb 12.8 oz)    Admission Cmt:  None   Principal Problem:  None                Active Insurance as of 2019     Primary Coverage     Payor Plan Insurance Group Employer/Plan Group    ANTHEM MEDICAID ANTH MEDICAID [id number]     Payor Plan Address Payor Plan Phone Number Payor Plan Fax Number Effective Dates    [address] [address] [phone]  2019 - None Entered    [facility] [id number]       Subscriber Name Subscriber Birth Date Member ID       NABILA CALDWELL 1995 [id number]                 Emergency Contacts      (Rel.) Home Phone Work Phone Mobile Phone    libia caldwell (Spouse) [phone] -- [phone]        DELIVERY INFORMATION:    ADMIT DATE: 2019    DELIVERY DATE AND TIME: 2019 @ 1207    DELIVERY TYPE: VAGINAL    GENDER OF BABY: MALE    WEIGHT:  2992 GRAMS    APGARS:  8/9    NURSERY TYPE: WELL BABY    GESTATIONAL AGE: 37/0    EDC: 2020    /PARA: 2/1     "   History & Physical      Heydi Donald DO at 19 1828           Kamuela  Obstetric History and Physical    Chief Complaint   Patient presents with   • Scheduled Induction       Subjective     Patient is a 24 y.o. female  currently at 36w6d, who presents for IOL secondary to GDM, CHTN, H/O IUFD 24 weeks secondary to pre-eclampsia, PGM and elevated Umbilical doppler ratio.    Her prenatal care is complicated by  hypertension  chronic hypertension and diabetes  GDM A2.  Her previous obstetric/gynecological history is noted for is remarkable for previous IUFD as above.      The following portions of the patients history were reviewed and updated as appropriate: current medications, allergies, past medical history, past surgical history, past family history, past social history and problem list .       Prenatal Information:   Maternal Prenatal Labs  Blood Type No results found for: ABO   Rh Status No results found for: RH   Antibody Screen No results found for: ABSCRN   Rapid Urine Drug Screen No results found for: AMPMETHU, BARBITSCNUR, LABBENZSCN, LABMETHSCN, LABOPIASCN, THCURSCR, COCAINEUR, AMPHETSCREEN, PROPOXSCN, BUPRENORSCNU, METAMPSCNUR, OXYCODONESCN, TRICYCLICSCN   Group B Strep Culture No results found for: GBSANTIGEN           External Prenatal Results     Pregnancy Outside Results - Transcribed From Office Records - See Scanned Records For Details     Test Value Date Time    Hgb 12.3 g/dL 11/15/19 1133    Hct 37.3 % 11/15/19 1133    ABO       Rh       Antibody Screen       Glucose Fasting GTT       Glucose Tolerance Test 1 hour       Glucose Tolerance Test 3 hour       Gonorrhea (discrete)       Chlamydia (discrete)       RPR       VDRL       Syphilis Antibody       Rubella       HBsAg       Herpes Simplex Virus PCR       Herpes Simplex VIrus Culture       HIV       Hep C RNA Quant PCR       Hep C Antibody       AFP       Group B Strep       GBS Susceptibility to Clindamycin       GBS  "Susceptibility to Erythromycin       Fetal Fibronectin       Genetic Testing, Maternal Blood             Drug Screening     Test Value Date Time    Urine Drug Screen       Amphetamine Screen       Barbiturate Screen       Benzodiazepine Screen       Methadone Screen       Phencyclidine Screen       Opiates Screen       THC Screen       Cocaine Screen       Propoxyphene Screen       Buprenorphine Screen       Methamphetamine Screen       Oxycodone Screen       Tricyclic Antidepressants Screen                     Past OB History:     OB History    Para Term  AB Living   2 1 0 1 0 0   SAB TAB Ectopic Molar Multiple Live Births   0 0 0 0 0 0      # Outcome Date GA Lbr Carlos A/2nd Weight Sex Delivery Anes PTL Lv   2 Current            1  10/20/16 24w0d  340 g (12 oz) F Vag-Spont   FD      Complications: Preeclampsia, Oligohydramnios, IUGR (intrauterine growth restriction) affecting care of mother      Obstetric Comments   FOB #1 : Pregnancy #1-2       Past Medical History: Past Medical History:   Diagnosis Date   • Disease of thyroid gland     10-15 yrs ago. No problems in years.    • Hx of preeclampsia, prior pregnancy, currently pregnant    • Prothrombin gene mutation (CMS/HCC)     \"heterozygous\"; dx @ [facility] after 24 week IUFD      Past Surgical History Past Surgical History:   Procedure Laterality Date   • BREAST AUGMENTATION        Family History: No family history on file.   Social History:  reports that she has never smoked. She has never used smokeless tobacco.   reports that she does not drink alcohol.   reports that she does not use drugs.        Review of Systems      Objective     Vital Signs Range for the last 24 hours  Temperature: Temp:  [98 °F (36.7 °C)] 98 °F (36.7 °C)   Temp Source: Temp src: Oral   BP:     Pulse:     Respirations: Resp:  [18] 18   Weight: Weight:  [87 kg (191 lb 12.8 oz)] 87 kg (191 lb 12.8 oz)     Physical Examination: General appearance - oriented to person, place, " and time  Chest - clear to auscultation, no wheezes, rales or rhonchi, symmetric air entry  Heart - normal rate and regular rhythm  Abdomen - soft, nontender, nondistended, no masses  Extremities - no pedal edema noted    Presentation: cephalic   Cervix: Exam by:  Dr Donald   Dilation:  Fingertip   Effacement:  50   Station:  -4         Assessment/Plan       Pregnant      Assessment & Plan    Assessment/Plan:  1.  Intrauterine pregnancy at 36w6d weeks gestation with reactive fetal status.    2.  A2DM- glyburide 2.5 mg at home but none today.  BS q1 hour and if normal q4 hour through the night.    3. CHTN- stable no medication  4. Heterozygous PGM- stopped ASA yesterday.   5. H/O IUFD with severe pre-eclampsia 24 weeks previous pregnancy.  For delivery today.   6. Elevated UA SD ratio- for delivery.       Heydi Donald DO  12/11/2019  6:28 PM    Electronically signed by Heydi Donald DO at 12/11/19 1835     [facility] at 12/11/19 2029     Scan on 12/11/2019: [employer] 12/11/2019          Electronically signed by Interface, Scans Incoming at 12/11/19 2029          Operative/Procedure Notes (all)      Heydi Donald DO at 12/12/19 1222  Version 1 of [address] [place] [address]  Vaginal Delivery Note    Delivery     Delivery:       YOB: 2021    Time of Birth: 12:07 PM      Anesthesia: Epidural     Delivering clinician: Heydi Donald    Forceps?   No   Vacuum? No    Shoulder dystocia present: No        Delivery narrative:              Pt delivered in the JOSE position.  Mouth and nares were bulb suctioned.  Anterior shoulder delivered atraumatically, followed by posterior shoulder.  Infant was placed to mother's chest.  Cord was doubly clamped and cut.  Cord blood was obtained.  Placenta was delivered spontaneously.  Uterus was firm with fundal massage.        Infant    Findings: male  infant     Infant observations: Weight: No birth weight on file.   Length:     in  Observations/Comments:         Apgars:    @ 1 minute /       @ 5 minutes   Infant Name:      Placenta, Cord, and Fluid    Placenta delivered     at        Cord:   3 VC present.   Nuchal Cord?  no   Cord blood obtained:   yes                  Repair    Episiotomy: Not recorded    Lacerations: No   Estimated Blood Loss:   250 mls.   Suture used for repair:      Complications  none    Disposition  Mother to postpartum in stable condition.    Heydi Donald DO  12/12/19  12:22 PM      Electronically signed by Heydi Donald DO at 12/12/19 3153

## 2019-12-12 NOTE — L&D DELIVERY NOTE
Derek  Vaginal Delivery Note    Delivery     Delivery:       YOB: 2021    Time of Birth: 12:07 PM      Anesthesia: Epidural     Delivering clinician: Heydi Donald    Forceps?   No   Vacuum? No    Shoulder dystocia present: No        Delivery narrative:              Pt delivered in the JOSE position.  Mouth and nares were bulb suctioned.  Anterior shoulder delivered atraumatically, followed by posterior shoulder.  Infant was placed to mother's chest.  Cord was doubly clamped and cut.  Cord blood was obtained.  Placenta was delivered spontaneously.  Uterus was firm with fundal massage.        Infant    Findings: male  infant     Infant observations: Weight: No birth weight on file.   Length:    in  Observations/Comments:         Apgars:    @ 1 minute /       @ 5 minutes   Infant Name:      Placenta, Cord, and Fluid    Placenta delivered     at        Cord:   3 VC present.   Nuchal Cord?  no   Cord blood obtained:   yes                  Repair    Episiotomy: Not recorded    Lacerations: No   Estimated Blood Loss:   250 mls.   Suture used for repair:      Complications  none    Disposition  Mother to postpartum in stable condition.    Heydi Donald DO  12/12/19  12:22 PM

## 2019-12-12 NOTE — ANESTHESIA PROCEDURE NOTES
Labor Epidural    Pre-sedation assessment completed: 12/12/2019 7:45 AM    Patient location during procedure: OB  Start Time: 12/12/2019 7:45 AM  Stop Time: 12/12/2019 7:57 AM  Indication:at surgeon's request  Performed By  Anesthesiologist: Domenico Thomas MD  Preanesthetic Checklist  Completed: patient identified, site marked, surgical consent, pre-op evaluation, timeout performed, IV checked, risks and benefits discussed and monitors and equipment checked  Prep:  Pt Position:sitting  Sterile Tech:gloves, mask, sterile barrier and cap  Prep:povidone-iodine 7.5% surgical scrub  Monitoring:blood pressure monitoring  Epidural Block Procedure:  Approach:midline  Guidance:landmark technique and palpation technique  Location:L3-L4  Needle Type:Tuohy  Needle Gauge:17 G  Loss of Resistance Medium: air  Loss of Resistance: 7cm  Cath Depth at skin:9 cm  Paresthesia: none  Aspiration:negative  Test Dose:negative  Number of Attempts: 1  Post Assessment:  Dressing:occlusive dressing applied and secured with tape  Pt Tolerance:patient tolerated the procedure well with no apparent complications  Complications:no

## 2019-12-12 NOTE — ANESTHESIA PREPROCEDURE EVALUATION
Anesthesia Evaluation     Patient summary reviewed and Nursing notes reviewed   no history of anesthetic complications:  NPO Solid Status: > 8 hours  NPO Liquid Status: > 8 hours           Airway   Mallampati: I  TM distance: >3 FB  Neck ROM: full  No difficulty expected  Dental - normal exam     Pulmonary - negative pulmonary ROS and normal exam   Cardiovascular - normal exam  Exercise tolerance: good (4-7 METS)    NYHA Classification: II    (+) hypertension,       Neuro/Psych- negative ROS  GI/Hepatic/Renal/Endo - negative ROS     Musculoskeletal (-) negative ROS    Abdominal  - normal exam    Bowel sounds: normal.   Substance History - negative use     OB/GYN negative ob/gyn ROS         Other - negative ROS       ROS/Med Hx Other: Prothrombin Gene Mutation                Anesthesia Plan    ASA 3     epidural       Anesthetic plan, all risks, benefits, and alternatives have been provided, discussed and informed consent has been obtained with: patient.

## 2019-12-13 NOTE — PAYOR COMM NOTE
"CONTACT:  PRAMOD BRONSON MSN, APRN  UTILIZATION MANAGEMENT DEPT.  [facility]  [address] [facility], [address]  PHONE:  [phone]  FAX: [phone]    PATIENT DISCHARGED TO HOME ON 12/12/19. AWAITING AUTHORIZATION.    ICD 10 CODE: O80  DR. UMU WEBER NPI: [id number]  [facility] NPI: [id number]       Nabila Caldwell (24 y.o. Female)     Date of Birth Social Security Number Address Home Phone MRN    1995  [address] [facility] [address] [phone] [id number]    Zoroastrianism Marital Status          Evangelical        Admission Date Admission Type Admitting Provider Attending Provider Department, Room/Bed    12/11/19 Urgent Heydi Donald DO  [facility] LABOR DELIVERY, L229/1    Discharge Date Discharge Disposition Discharge Destination        12/12/2019 Home or Self Care              Attending Provider:  (none)   Allergies:  Stadol [Butorphanol]    Isolation:  None   Infection:  None   Code Status:  Prior    Ht:  152.4 cm (60\")   Wt:  87 kg (191 lb 12.8 oz)    Admission Cmt:  None   Principal Problem:  None                Active Insurance as of 12/11/2019     Primary Coverage     Payor Plan Insurance Group Employer/Plan Group    [employer]HE[employer] MEDICAID [employer] MEDICAID [id number]     Payor Plan Address Payor Plan Phone Number Payor Plan Fax Number Effective Dates    [address] [address] [phone]  4/1/2019 - None Entered    [facility] [address]       Subscriber Name Subscriber Birth Date Member ID       NABILA CALDWELL 1995 [id number]                 Emergency Contacts      (Rel.) Home Phone Work Phone Mobile Phone    libia caldwell (Spouse) [phone] -- [phone]              DELIVERY INFORMATION:     ADMIT DATE: 12/11/2019     DELIVERY DATE AND TIME: 1/12/2019 @ 1207     DELIVERY TYPE: VAGINAL     GENDER OF BABY: MALE     WEIGHT:  2992 GRAMS     APGARS:  8/9     NURSERY TYPE: WELL BABY     GESTATIONAL AGE: 37/0     EDC: " 2020     /PARA: 2/1                      Discharge Summary      Boone Casper III, MD at 19 1450          Discharge Summary: Vaginal Delivery     Admit Date: 2019  5:46 PM    Admit Diagnosis: Pregnant [Z34.90]    Date of Discharge:  2019    Discharge Diagnosis: Same        Hospital Course  Patient is a 24 y.o. female, G 2, now P 1101. S/P . PPD#0. Patient underwent a  today. Infant is being transferred. Patient requests to be discharged to home to be with infant. No complaints.     Procedures Performed  Normal Spontaneous Vaginal Delivery         Vital Signs  Temp:  [96.6 °F (35.9 °C)-98.6 °F (37 °C)] 98.6 °F (37 °C)  Heart Rate:  [] 86  Resp:  [18] 18  BP: ()/() 127/63    Review of Systems    The following systems were reviewed and negative;  ENT, respiratory, cardiovascular, gastrointestinal, genitourinary, breast, endocrine and allergies / immunologic.      Physical Exam:      General Appearance:    Alert, cooperative, in no acute distress   Head:    Normocephalic, without obvious abnormality, atraumatic   Eyes:            Lids and lashes normal, conjunctivae and sclerae normal, no   icterus, no pallor, corneas clear, PERRLA   Ears:    Ears appear intact with no abnormalities noted   Throat:   No oral lesions, no thrush, oral mucosa moist   Neck:   No adenopathy, supple, trachea midline, no thyromegaly, no     carotid bruit, no JVD   Back:     No kyphosis present, no scoliosis present, no skin lesions,       erythema or scars, no tenderness to percussion or                   palpation,   range of motion normal   Lungs:     Clear to auscultation,respirations regular, even and                   unlabored    Heart:    Regular rhythm and normal rate, normal S1 and S2, no            murmur, no gallop, no rub, no click   Breast Exam:    Deferred   Abdomen:     Normal bowel sounds, no masses, no organomegaly, soft        non-tender, non-distended, no guarding, no  rebound                 tenderness   Genitalia:    Deferred   Extremities:   Moves all extremities well, no edema, no cyanosis, no              redness   Pulses:   Pulses palpable and equal bilaterally   Skin:   No bleeding, bruising or rash   Lymph nodes:   No palpable adenopathy   Neurologic:   Cranial nerves 2 - 12 grossly intact, sensation intact, DTR        present and equal bilaterally             Condition on Discharge:  Stable    Urine Output Good    Discharge Diet: Regular    Discharge Medications  Motrin 800 mg q 6 #30    Activity at Discharge: No driving x 2 weeks. Nothing per vagina until cleared by a physician. Patient expected to return to work or school in 6 weeks.     Follow-up Appointments  Patient will follow up with Dr. Donald in 2 weeks.        Boone Casper MD.   12/12/19  2:51 PM              Electronically signed by Boone Casper III, MD at 12/12/19 6126